# Patient Record
Sex: FEMALE | Race: WHITE | NOT HISPANIC OR LATINO | URBAN - METROPOLITAN AREA
[De-identification: names, ages, dates, MRNs, and addresses within clinical notes are randomized per-mention and may not be internally consistent; named-entity substitution may affect disease eponyms.]

---

## 2020-10-13 ENCOUNTER — HOSPITAL ENCOUNTER (OUTPATIENT)
Dept: RADIOLOGY | Facility: HOSPITAL | Age: 45
Discharge: HOME | End: 2020-10-13
Attending: INTERNAL MEDICINE
Payer: COMMERCIAL

## 2020-10-13 ENCOUNTER — OFFICE VISIT (OUTPATIENT)
Dept: RHEUMATOLOGY | Facility: CLINIC | Age: 45
End: 2020-10-13
Payer: COMMERCIAL

## 2020-10-13 VITALS
HEIGHT: 64 IN | TEMPERATURE: 97 F | HEART RATE: 100 BPM | DIASTOLIC BLOOD PRESSURE: 71 MMHG | WEIGHT: 123 LBS | BODY MASS INDEX: 21 KG/M2 | SYSTOLIC BLOOD PRESSURE: 94 MMHG

## 2020-10-13 DIAGNOSIS — M25.50 POLYARTHRALGIA: ICD-10-CM

## 2020-10-13 DIAGNOSIS — K13.79 RECURRENT ORAL ULCERS: Primary | ICD-10-CM

## 2020-10-13 DIAGNOSIS — N76.5 VAGINAL ULCER: ICD-10-CM

## 2020-10-13 DIAGNOSIS — K13.79 RECURRENT ORAL ULCERS: ICD-10-CM

## 2020-10-13 DIAGNOSIS — M54.9 DORSALGIA, UNSPECIFIED: ICD-10-CM

## 2020-10-13 DIAGNOSIS — R21 RASH: ICD-10-CM

## 2020-10-13 PROCEDURE — 99205 PR OFFICE/OUTPT VISIT, NEW, LEVL V, 60-74 MIN: ICD-10-PCS | Mod: S$GLB,,, | Performed by: INTERNAL MEDICINE

## 2020-10-13 PROCEDURE — 3008F PR BODY MASS INDEX (BMI) DOCUMENTED: ICD-10-PCS | Mod: CPTII,S$GLB,, | Performed by: INTERNAL MEDICINE

## 2020-10-13 PROCEDURE — 71046 X-RAY EXAM CHEST 2 VIEWS: CPT | Mod: TC

## 2020-10-13 PROCEDURE — 99999 PR PBB SHADOW E&M-NEW PATIENT-LVL IV: ICD-10-PCS | Mod: PBBFAC,,, | Performed by: INTERNAL MEDICINE

## 2020-10-13 PROCEDURE — 3008F BODY MASS INDEX DOCD: CPT | Mod: CPTII,S$GLB,, | Performed by: INTERNAL MEDICINE

## 2020-10-13 PROCEDURE — 72170 X-RAY EXAM OF PELVIS: CPT | Mod: 26,,, | Performed by: RADIOLOGY

## 2020-10-13 PROCEDURE — 71046 X-RAY EXAM CHEST 2 VIEWS: CPT | Mod: 26,,, | Performed by: RADIOLOGY

## 2020-10-13 PROCEDURE — 71046 XR CHEST PA AND LATERAL: ICD-10-PCS | Mod: 26,,, | Performed by: RADIOLOGY

## 2020-10-13 PROCEDURE — 99205 OFFICE O/P NEW HI 60 MIN: CPT | Mod: S$GLB,,, | Performed by: INTERNAL MEDICINE

## 2020-10-13 PROCEDURE — 72170 XR PELVIS ROUTINE AP: ICD-10-PCS | Mod: 26,,, | Performed by: RADIOLOGY

## 2020-10-13 PROCEDURE — 72170 X-RAY EXAM OF PELVIS: CPT | Mod: TC

## 2020-10-13 PROCEDURE — 99999 PR PBB SHADOW E&M-NEW PATIENT-LVL IV: CPT | Mod: PBBFAC,,, | Performed by: INTERNAL MEDICINE

## 2020-10-13 RX ORDER — QUETIAPINE FUMARATE 100 MG/1
TABLET, FILM COATED ORAL
COMMUNITY
Start: 2020-09-22

## 2020-10-13 RX ORDER — COLCHICINE 0.6 MG/1
0.6 TABLET ORAL 2 TIMES DAILY
Qty: 60 TABLET | Refills: 3 | Status: SHIPPED | OUTPATIENT
Start: 2020-10-13 | End: 2020-11-12

## 2020-10-13 RX ORDER — NAPROXEN 500 MG/1
500 TABLET ORAL 2 TIMES DAILY
Qty: 60 TABLET | Refills: 3 | Status: SHIPPED | OUTPATIENT
Start: 2020-10-13 | End: 2020-11-12

## 2020-10-13 RX ORDER — LAMOTRIGINE 25 MG/1
TABLET ORAL
COMMUNITY
Start: 2020-07-29

## 2020-10-13 RX ORDER — METHYLPREDNISOLONE 4 MG/1
TABLET ORAL
COMMUNITY
Start: 2020-08-03

## 2020-10-13 RX ORDER — CLONAZEPAM 0.5 MG/1
TABLET ORAL
COMMUNITY
Start: 2020-09-22

## 2020-10-13 RX ORDER — ESCITALOPRAM OXALATE 20 MG/1
TABLET ORAL
COMMUNITY
Start: 2020-07-19

## 2020-10-13 RX ORDER — COLCHICINE 0.6 MG/1
TABLET ORAL
COMMUNITY
Start: 2020-09-18 | End: 2020-10-13 | Stop reason: SDUPTHER

## 2020-10-13 RX ORDER — LISDEXAMFETAMINE DIMESYLATE 50 MG/1
CAPSULE ORAL
COMMUNITY
Start: 2020-09-17

## 2020-10-13 NOTE — PROGRESS NOTES
History of present illness:  45-year-old female who is referred in for the possibility of Behcet's syndrome.  She has a lifelong history of oral ulcers.  They are usually on the gums and lips.  They are not on the hard palate.  They had been occurring up to weekly.  She would usually treat them topically.  Over the summer she developed a vaginal ulcer.  She was seen by her gynecologist who did cultures, which were negative.  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  She was started on colchicine, initially twice daily.  She is only able to tolerate it once daily because of GI upset.  This has cut down on the number of ulcers.    She has had papules on both inner thighs.  They are not painful.  She complains of diffuse aching, especially the back and knees.  The aching has been intermittent and migratory except for the back which has bothered her since July.  She has had previous swelling in her knees but no erythema or increased warmth.  The episodes in the peripheral joints usually last a week or 2.  She has had no prior episodes of back or peripheral joint problems.  Her back is worse when she is inactive.  It does wake her up at night.  It is bad in the morning.  She has 1 hr morning stiffness.  She has no radicular pain.    She has been taking over-the-counter analgesics with some relief.  Heat, ice, topical medications have not helped.  She has been given Medrol Dosepak, which does help.    She complains of low-grade fevers with her episodes.  She denies any headaches.  She has had no conjunctivitis.  She complains of dryness of her eyes and mouth.  She has had no nasal ulcers.  She has no Raynaud's phenomena, pleurisy, chronic or bloody diarrhea, numbness or tingling.  She has no history of phlebitis.  She is a  4 para 3 with a 2nd trimester miscarriage.  Her mother and maternal grandmother have had oral ulcers in arthritis.  They have never been diagnosed as to  the cause of their ulcers.  She is of [place] and [place] ancestry.    Systems review:  General:  Weight has been stable  ENT:  Decreased vision without change in hearing  Respiratory:  No chronic cough or sputum production.  No shortness of breath.  GI:  No abdominal pain or peptic ulcer disease.  No liver problems.  :  No kidney or bladder problems    Physical examination:  Skin:  She has a single papule of the left inner thigh.  She has 2 hyper pigmented areas of the right inner thigh which were previously papules.  ENT:  No conjunctivitis.  She may be developing early ulceration of the gumline and the tonsillar fossa but has no definite oral ulcers.  She has no nasal ulcers.  Chest:  Clear to auscultation and percussion  Cardiac:  No murmurs, gallops, rubs  Abdomen:  No organomegaly or masses.  No tenderness to palpation  Extremities:  No pedal edema  Lymphadenopathy:  None palpable  Musculoskeletal:  Cervical spine, shoulders, elbows, wrists, small joints of the hands are unremarkable.  She has tenderness to palpation of the mid and lower lumbar spine extending into the coccyx.  It does not involve the SI joints.  She has pain on range of motion of the lumbar spine but has full range of motion.  She has negative straight leg raising.  Hips, knees, ankles, small joints the feet are unremarkable.    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  I do wish to rule out other etiologies.    Plans:  1.  Laboratory and x-ray studies are ordered  2.  Try to increase to colchicine back to twice daily.  Otherwise continue daily.  3.  I placed her on Naprosyn 500 mg twice daily  4.  Return in 3 months

## 2020-10-19 ENCOUNTER — TELEPHONE (OUTPATIENT)
Dept: RHEUMATOLOGY | Facility: CLINIC | Age: 45
End: 2020-10-19

## 2020-10-19 NOTE — TELEPHONE ENCOUNTER
----- Message from Nicole Michael MA sent at 10/16/2020  2:36 PM CDT -----    ----- Message -----  From: Rubens Hemphill MD  Sent: 10/16/2020   2:31 PM CDT  To: [facility] Staff    There is still 1 blood tests pending everything back so far is negative.  The x-ray was also normal.  Nothing on the test to account for the ulcers

## 2020-10-28 ENCOUNTER — PATIENT MESSAGE (OUTPATIENT)
Dept: RHEUMATOLOGY | Facility: CLINIC | Age: 45
End: 2020-10-28

## 2021-02-23 ENCOUNTER — PATIENT MESSAGE (OUTPATIENT)
Dept: RHEUMATOLOGY | Facility: CLINIC | Age: 46
End: 2021-02-23